# Patient Record
Sex: FEMALE | URBAN - METROPOLITAN AREA
[De-identification: names, ages, dates, MRNs, and addresses within clinical notes are randomized per-mention and may not be internally consistent; named-entity substitution may affect disease eponyms.]

---

## 2023-03-09 ENCOUNTER — EMERGENCY (EMERGENCY)
Facility: HOSPITAL | Age: 23
LOS: 1 days | Discharge: HOME | End: 2023-03-09
Attending: EMERGENCY MEDICINE | Admitting: EMERGENCY MEDICINE
Payer: COMMERCIAL

## 2023-03-09 VITALS
RESPIRATION RATE: 16 BRPM | OXYGEN SATURATION: 98 % | DIASTOLIC BLOOD PRESSURE: 71 MMHG | HEART RATE: 65 BPM | SYSTOLIC BLOOD PRESSURE: 103 MMHG | TEMPERATURE: 98 F

## 2023-03-09 VITALS
DIASTOLIC BLOOD PRESSURE: 75 MMHG | OXYGEN SATURATION: 98 % | HEART RATE: 83 BPM | HEIGHT: 65 IN | WEIGHT: 136.69 LBS | TEMPERATURE: 98 F | SYSTOLIC BLOOD PRESSURE: 139 MMHG

## 2023-03-09 DIAGNOSIS — Z11.3 ENCOUNTER FOR SCREENING FOR INFECTIONS WITH A PREDOMINANTLY SEXUAL MODE OF TRANSMISSION: ICD-10-CM

## 2023-03-09 LAB
HCG SERPL-ACNC: <1 MIU/ML — SIGNIFICANT CHANGE UP
HIV 1 & 2 AB SERPL IA.RAPID: SIGNIFICANT CHANGE UP

## 2023-03-09 PROCEDURE — 99284 EMERGENCY DEPT VISIT MOD MDM: CPT

## 2023-03-09 RX ORDER — CEFTRIAXONE 500 MG/1
500 INJECTION, POWDER, FOR SOLUTION INTRAMUSCULAR; INTRAVENOUS ONCE
Refills: 0 | Status: COMPLETED | OUTPATIENT
Start: 2023-03-09 | End: 2023-03-09

## 2023-03-09 RX ADMIN — Medication 100 MILLIGRAM(S): at 16:40

## 2023-03-09 RX ADMIN — CEFTRIAXONE 500 MILLIGRAM(S): 500 INJECTION, POWDER, FOR SOLUTION INTRAMUSCULAR; INTRAVENOUS at 16:40

## 2023-03-09 NOTE — ED ADULT NURSE REASSESSMENT NOTE - NS ED NURSE REASSESS COMMENT FT1
Received pt from RNBrittney, pt is a/ox3 resting in bed. Pt states that she needs rest. Denies pain. Inform pt that  will speak to her shortly.
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
## 2023-03-09 NOTE — ED PROVIDER NOTE - PHYSICAL EXAMINATION
VITAL SIGNS: I have reviewed nursing notes and confirm.  CONSTITUTIONAL: Well-developed; well-nourished; in no acute distress.  SKIN: No rash on visible skin  HEAD: Normocephalic; atraumatic.  EYES: EOM intact; conjunctiva and sclera clear.  ENT: nose appears normal  NECK: Supple  RESP: Breathing comfortably on RA  EXT: Normal ROM. No clubbing, cyanosis or edema.  PSYCH: Cooperative, tearful, guarded
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
## 2023-03-09 NOTE — ED PROVIDER NOTE - NSFOLLOWUPINSTRUCTIONS_ED_ALL_ED_FT
Sexual Assault      Sexual assault is any unwanted sexual activity that occurs without clear permission (consent) from both people. If a person does not have the mental ability to give consent, consent cannot happen. No one has the right to have sexual contact with another person without the person's consent. Forms of sexual assault include:  •Unwanted touching.      •Penetration. This may include vaginal, oral, or anal penetration.      •Incest.      •Human sex trafficking.      •Sexual harassment.      •Any form of sexual activity that occurs when a person is unable to give consent.      Sexual assault can happen to a person of any age, gender, or race. It can:  •Be committed by a stranger or by someone you know.      •Include force, threats, or pressure to be involved in sexual activity that you do not want.        What are the causes?    The cause of a sexual assault is the person (perpetrator) of the violence. It is never the fault of the person who is assaulted.      What increases the risk?    The following factors make someone more likely to commit an assault:  •Substance abuse.      •Lack of concern for others.      •Aggressive behavior.      •Preference for impersonal sex.      •Hostility toward women, if a woman is assaulted.      •Hypermasculinity, if a male is the perpetrator.      •Having a history of sexual violence.        What are the signs or symptoms?    Symptoms of this condition include:  •Physical injuries in the genital area or other areas of the body.      •STIs (sexually transmitted infections).      •Unwanted pregnancy.    •Emotional or psychological problems. These may include:  •Anxiety, depression, or post-traumatic stress disorder (PTSD).      •Shock and disbelief.      •Irritability and edginess.      •Feeling overwhelmed.      •Feeling angry and having thoughts of revenge.      •Guilt or shame.        Other symptoms may include long-term health conditions, such as:  •Headaches.      •Chronic pain.      •Insomnia.      •Irritable bowel syndrome.      •Substance use disorder.        How is this diagnosed?     If you are sexually assaulted, get medical care as soon as possible. Your health care provider may perform a physical exam or test for infections. Testing for pregnancy may be done, if it applies.    It is important to know your options for the sexual assault exam. You can accept or decline any part of the exam. Your health care provider can answer any questions that you have before, during, or after the exam.    During your physical exam, your health care provider may:  •Ask you questions about what happened during the sexual assault.      •Check your body for injuries or areas of pain.      •Collect samples to test for STIs.    •Collect samples from your body for evidence, if you choose to have this done. These samples may include:  •Swabs.      •Clothing.      •Blood.      •Urine.      •Hair.      •Material or debris that is found on or in your body.      •Take photographs for documentation, if you might take legal action at a later time.  •Photographs will not be taken unless you give your consent.      •If photographs are taken, they will be kept safe, along with other samples that you may choose to have collected for evidence.        Decide whether you want to have evidence collected from your body. If you choose to have evidence collected, it is best to have it done as soon as possible.  •This evidence may be used if you choose to take legal action (press charges) at a later time.      •You may be able to ask for the evidence to be held by local authorities until you decide about taking legal action.        How is this treated?    In addition to performing a physical exam, your health care provider may:  •Offer you emergency birth control (contraception) if you are at risk for pregnancy.      •Prescribe medicines to treat or prevent STIs. You may need to have additional evaluation and testing for STIs over a period of 3–6 months after the assault.      •Give you immunizations, such as the hepatitis B and HPV vaccines. You may need to get multiple doses of immunizations over a period of several months.        Follow these instructions at home:    Talking to others     •Consider having counseling after a sexual assault. Your health care provider or a sexual assault advocate may be able to recommend a counselor.    •Consider working with a sexual assault advocate. This person may be able to provide:  •Information about crime victim assistance.      •Information on filing Orders for Protection and Harassment Restraining Orders.      •Emotional support.        General instructions     •Take over-the-counter and prescription medicines only as told by your health care provider.      •Use a condom with your sexual partner, if this applies, until all of your STI tests are negative. You may need to use a condom for 3–6 months after the sexual assault.      •Get immunizations as needed.      •Keep all follow-up visits. This is important.        Where to find more information    •National Sexual Assault Hotline: 1-381.875.2745 (HOPE) or hotline.[place] ([employer]      •The National Domestic Violence Hotline: 1-682.643.7383 (SAFE) or [employer]      •Office on Women's Health: womenshealth.gov        Contact a health care provider if:  •You have signs of infection, such as:  •Discharge from your penis or vagina.      •A bad smell coming from your vagina.      •Burning when you urinate.      •A feeling of pressure when you urinate.      •Sores or blisters on your genital area.      •Swelling in your neck (lymph nodes).        •You feel pain during sex.      •You feel pain in your abdomen.    •You have symptoms of anxiety, depression, or PTSD. Symptoms may include:  •Trouble sleeping.      •Irritability.      •Having unwanted distressing memories while awake.      •Physical reactions triggered by reminders of the trauma, such as increased heart rate, shortness of breath, sweating, and shaking.      •Having flashbacks, or feeling like you are going through the event again.      •Decreased interest or participation in daily activities.      •Loss of connection or avoiding other people.          Get help right away if:    •You are sexually assaulted.      •You have thoughts of harming yourself or others.      If you ever feel like you may hurt yourself or others, or have thoughts about taking your own life, get help right away. Go to your nearest emergency department or:    • Call your local emergency services (482 in the [place]       • Call a suicide crisis helpline, such as the National Suicide Prevention Lifeline at 1-548.820.4810 or 032 in the [place] This is open 24 hours a day in the [place]       • Text the Crisis Text Line at 107809 (in the [place]         Summary    •Sexual assault is any unwanted sexual activity that occurs without clear permission (consent) from both people.      •Sexual assault is never the fault of the person who is assaulted. No one has the right to have sexual contact with another person without the person's consent.      •It is important to get medical care as soon as possible after a sexual assault.      •It is important to know your options for the sexual assault exam. You can accept or decline any part of the exam.      •A sexual assault advocate can provide you with information about crime victim assistance and offer emotional support.      This information is not intended to replace advice given to you by your health care provider. Make sure you discuss any questions you have with your health care provider.

## 2023-03-09 NOTE — ED PROVIDER NOTE - PATIENT PORTAL LINK FT
You can access the FollowMyHealth Patient Portal offered by Health system by registering at the following website: [email or web address]. By joining [employer]’s FollowMyHealth portal, you will also be able to view your health information using other applications (apps) compatible with our system.

## 2023-03-10 LAB
C TRACH RRNA SPEC QL NAA+PROBE: SIGNIFICANT CHANGE UP
N GONORRHOEA RRNA SPEC QL NAA+PROBE: SIGNIFICANT CHANGE UP
SPECIMEN SOURCE: SIGNIFICANT CHANGE UP
T PALLIDUM AB TITR SER: NEGATIVE — SIGNIFICANT CHANGE UP